# Patient Record
Sex: FEMALE | Race: WHITE | Employment: UNEMPLOYED | URBAN - METROPOLITAN AREA
[De-identification: names, ages, dates, MRNs, and addresses within clinical notes are randomized per-mention and may not be internally consistent; named-entity substitution may affect disease eponyms.]

---

## 2019-12-04 ENCOUNTER — ANCILLARY PROCEDURE (OUTPATIENT)
Dept: GENERAL RADIOLOGY | Facility: CLINIC | Age: 39
End: 2019-12-04
Attending: FAMILY MEDICINE

## 2019-12-04 ENCOUNTER — OFFICE VISIT (OUTPATIENT)
Dept: FAMILY MEDICINE | Facility: CLINIC | Age: 39
End: 2019-12-04

## 2019-12-04 VITALS
SYSTOLIC BLOOD PRESSURE: 120 MMHG | DIASTOLIC BLOOD PRESSURE: 76 MMHG | BODY MASS INDEX: 32.13 KG/M2 | TEMPERATURE: 98.7 F | HEIGHT: 68 IN | OXYGEN SATURATION: 99 % | RESPIRATION RATE: 20 BRPM | WEIGHT: 212 LBS | HEART RATE: 81 BPM

## 2019-12-04 DIAGNOSIS — R07.81 RIB PAIN ON LEFT SIDE: ICD-10-CM

## 2019-12-04 DIAGNOSIS — M25.552 HIP PAIN, LEFT: ICD-10-CM

## 2019-12-04 DIAGNOSIS — M25.552 HIP PAIN, LEFT: Primary | ICD-10-CM

## 2019-12-04 PROCEDURE — 73501 X-RAY EXAM HIP UNI 1 VIEW: CPT | Mod: FY

## 2019-12-04 PROCEDURE — 99203 OFFICE O/P NEW LOW 30 MIN: CPT | Performed by: FAMILY MEDICINE

## 2019-12-04 PROCEDURE — 71101 X-RAY EXAM UNILAT RIBS/CHEST: CPT | Mod: LT

## 2019-12-04 RX ORDER — GABAPENTIN 300 MG/1
300 CAPSULE ORAL 3 TIMES DAILY
Qty: 90 CAPSULE | Refills: 1 | Status: SHIPPED | OUTPATIENT
Start: 2019-12-04

## 2019-12-04 SDOH — HEALTH STABILITY: MENTAL HEALTH: HOW OFTEN DO YOU HAVE A DRINK CONTAINING ALCOHOL?: NEVER

## 2019-12-04 ASSESSMENT — MIFFLIN-ST. JEOR: SCORE: 1695.13

## 2019-12-04 ASSESSMENT — PAIN SCALES - GENERAL: PAINLEVEL: EXTREME PAIN (8)
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
## 2019-12-06 ENCOUNTER — TELEPHONE (OUTPATIENT)
Dept: FAMILY MEDICINE | Facility: CLINIC | Age: 39
End: 2019-12-06

## 2019-12-06 NOTE — TELEPHONE ENCOUNTER
Alfredo Weeks MD Alderman, Shawn, MD             Please advise patient that hip x-ray was read by radiologist, he was concerned about a mild irregularity in that area.  Please continue with plan to get MRI follow-up afterwards.     Alfredo Weeks MD, [facility]   Family Medicine Physician   [facility] [place]   [address] [place] [address]          Phone kept ringing will try again later.

## 2019-12-06 NOTE — RESULT ENCOUNTER NOTE
Please advise patient that hip x-ray was read by radiologist, he was concerned about a mild irregularity in that area.  Please continue with plan to get MRI follow-up afterwards.    Alfredo Weeks MD, FAAFP  Family Medicine Physician  [facility]  [address] [place] [address]

## 2019-12-06 NOTE — LETTER
[facility] ([facility]3]  [address], [address]  [place] [address]  [phone]      December 9, 2019    Tory Nowak                                                                                                                     [address] [place] [id number]          Dear Tory,    We have attempted to contact you by telephone without success.  Your Provider has results for you:  Please advise patient that hip x-ray was read by radiologist, he was concerned about a mild irregularity in that area.  Please continue with plan to get MRI follow-up afterwards.     Alfredo Weeks MD, FAAFP      Sincerely,   [facility] Support Staff / [place]

## 2019-12-09 NOTE — TELEPHONE ENCOUNTER
Phone kept ringing non stop again.     Not sure if this is correct phone number?  Only number on file.